# Patient Record
Sex: FEMALE | Race: OTHER | Employment: FULL TIME | URBAN - METROPOLITAN AREA
[De-identification: names, ages, dates, MRNs, and addresses within clinical notes are randomized per-mention and may not be internally consistent; named-entity substitution may affect disease eponyms.]

---

## 2022-01-05 ENCOUNTER — APPOINTMENT (OUTPATIENT)
Dept: CT IMAGING | Facility: HOSPITAL | Age: 47
End: 2022-01-05
Attending: STUDENT IN AN ORGANIZED HEALTH CARE EDUCATION/TRAINING PROGRAM

## 2022-01-05 PROCEDURE — 70450 CT HEAD/BRAIN W/O DYE: CPT | Performed by: STUDENT IN AN ORGANIZED HEALTH CARE EDUCATION/TRAINING PROGRAM

## 2022-01-06 NOTE — ED QUICK NOTES
Pt son contacted by Mer CHAPMAN. Son on way to hospital. Son reports patient has a severe drinking problem.

## 2022-01-06 NOTE — ED PROVIDER NOTES
Patient Seen in: [facility] Emergency Department      History   Patient presents with:  Exposure to Cold  Eval-D    Stated Complaint: hypothermia    Subjective:   HPI    28-year-old female brought in by EMS, found in the snow on the side of the high Mouth/Throat:      Mouth: Mucous membranes are moist.   Eyes:      Extraocular Movements: Extraocular movements intact. Conjunctiva/sclera: Conjunctivae normal.   Cardiovascular:      Rate and Rhythm: Regular rhythm. Tachycardia present.    Pulmonary: hemoglobin.   Pregnancy negative  ------------------------------------------------------------  Time: 01/05 2033  Comment: EKG interpretation by me: EKG sinus tachy rhythm at a rate of 112, axis lad, no concerning acute ischemic ST changes  ----------------

## 2022-01-06 NOTE — ED INITIAL ASSESSMENT (HPI)
Pt found on ground on the side of the road; unknown amount of exposure time. Pt reported she had been drinking all day, unknown amount.